# Patient Record
Sex: FEMALE | Race: BLACK OR AFRICAN AMERICAN
[De-identification: names, ages, dates, MRNs, and addresses within clinical notes are randomized per-mention and may not be internally consistent; named-entity substitution may affect disease eponyms.]

---

## 2020-01-27 ENCOUNTER — HOSPITAL ENCOUNTER (EMERGENCY)
Dept: HOSPITAL 63 - ER | Age: 35
Discharge: HOME | End: 2020-01-27
Payer: OTHER GOVERNMENT

## 2020-01-27 VITALS — WEIGHT: 199.08 LBS | HEIGHT: 67 IN | BODY MASS INDEX: 31.25 KG/M2

## 2020-01-27 VITALS — DIASTOLIC BLOOD PRESSURE: 101 MMHG | SYSTOLIC BLOOD PRESSURE: 164 MMHG

## 2020-01-27 DIAGNOSIS — L02.211: Primary | ICD-10-CM

## 2020-01-27 PROCEDURE — 99283 EMERGENCY DEPT VISIT LOW MDM: CPT

## 2020-01-27 NOTE — PHYS DOC
Adult General


Chief Complaint


Chief Complaint:  SKIN PROBLEM





HPI


HPI





Patient is a 34-year-old female who presents with complaint of tender, swollen 

area on her right lower abdomen. Patient states that it started about 9 days ago

when she was seen at [facility] and was told to use warm compre

sses. She states that [facility] was [facility] and they gave the same 

instructions. Patient has not been given a prescription for antibiotics. She 

states that yesterday she said first started noticing some drainage to the area 

and there was a lot of purulent fluid initially coming from it. She states that 

today she had taken the Band-Aid off and she noticed a hole that had not been 

there before. She states that it continues to have purulent drainage mixed with 

blood. She denies any fever.[]





Review of Systems


Review of Systems





Constitutional: Denies fever or chills []


Respiratory: Denies cough or shortness of breath []


Cardiovascular: No additional information not addressed in HPI []


Integument: Positive abscess[]


Neurologic: Denies headache, focal weakness or sensory changes []





Allergies


Allergies





Allergies








Coded Allergies Type Severity Reaction Last Updated Verified


 


  No Known Drug Allergies    1/27/20 No











Physical Exam


Physical Exam





Constitutional: Well developed, well nourished, no acute distress, non-toxic 

appearance. []


Cardiovascular:Heart rate regular rhythm, no murmur []


Lungs & Thorax:  Bilateral breath sounds clear to auscultation []


Skin: Right lower abdomen demonstrates area of redness, tenderness and warmth 

along with central fluctuance. Pressure over the wound demonstrated purulent 

drainage mixed with blood. As much drainage as possible was expressed and Band-

Aid replaced over wound. [] 


Neurologic: Alert and oriented X 3, no focal deficits noted. []





EKG


EKG


[]





Radiology/Procedures


Radiology/Procedures


[]





Course & Med Decision Making


Course & Med Decision Making


Pertinent Labs and Imaging studies reviewed. (See chart for details)





Abscess was freely draining. No incision necessary for drainage.





Dragon Disclaimer


Dragon Disclaimer


This electronic medical record was generated, in whole or in part, using a voice

 recognition dictation system.





Departure


Departure:


Impression:  


   Primary Impression:  


   Abscess


Disposition:  01 HOME, SELF-CARE


Condition:  STABLE


Referrals:  


ZACK COLINDRES MD (PCP)


Patient Instructions:  Abscess


Scripts


Sulfamethoxazole/Trimethoprim (BACTRIM DS TABLET) 1 Each Tablet


1 TAB PO BID for infection for 10 Days, #20 TAB 0 Refills


   Prov: LYNNE DEE Jr. DO         1/27/20











LYNNE DEE Jr. DO          Jan 27, 2020 19:17 24-Oct-2017 20:02